# Patient Record
Sex: FEMALE | Race: WHITE | Employment: OTHER | URBAN - METROPOLITAN AREA
[De-identification: names, ages, dates, MRNs, and addresses within clinical notes are randomized per-mention and may not be internally consistent; named-entity substitution may affect disease eponyms.]

---

## 2017-05-22 ENCOUNTER — HOSPITAL ENCOUNTER (OUTPATIENT)
Age: 82
Discharge: HOME | End: 2017-05-22
Attending: FAMILY MEDICINE
Payer: MEDICARE

## 2017-05-22 VITALS
RESPIRATION RATE: 20 BRPM | HEART RATE: 67 BPM | DIASTOLIC BLOOD PRESSURE: 86 MMHG | SYSTOLIC BLOOD PRESSURE: 179 MMHG | OXYGEN SATURATION: 99 %

## 2017-05-22 DIAGNOSIS — J02.9 PHARYNGITIS, UNSPECIFIED ETIOLOGY: Primary | ICD-10-CM

## 2017-05-22 PROCEDURE — 87430 STREP A AG IA: CPT | Performed by: FAMILY MEDICINE

## 2017-05-22 PROCEDURE — 99214 OFFICE O/P EST MOD 30 MIN: CPT

## 2017-05-22 PROCEDURE — 87081 CULTURE SCREEN ONLY: CPT | Performed by: FAMILY MEDICINE

## 2017-05-22 PROCEDURE — 99204 OFFICE O/P NEW MOD 45 MIN: CPT

## 2017-05-22 RX ORDER — IBUPROFEN 200 MG
400 TABLET ORAL ONCE
Status: COMPLETED | OUTPATIENT
Start: 2017-05-22 | End: 2017-05-22

## 2017-05-22 RX ORDER — PREDNISONE 20 MG/1
20 TABLET ORAL DAILY
Qty: 3 TABLET | Refills: 0 | Status: SHIPPED | OUTPATIENT
Start: 2017-05-22 | End: 2017-05-25

## 2017-05-22 RX ORDER — AMOXICILLIN 875 MG/1
875 TABLET, COATED ORAL 2 TIMES DAILY
Qty: 20 TABLET | Refills: 0 | Status: SHIPPED | OUTPATIENT
Start: 2017-05-22 | End: 2017-06-01

## 2017-05-22 NOTE — ED INITIAL ASSESSMENT (HPI)
Friday onset sore throat. FYI - Took 1 Cipro on Friday for possible UTI prescribed by PCP for urinary urgency. Pt then stopped Cipro after reading side effects. She further states she was told by office that she didn't have an infection.    Pt denies s

## 2017-05-24 NOTE — ED PROVIDER NOTES
Patient Seen in: THE [facility] In [facility] END    History   Patient presents with:  Sore Throat    Stated Complaint: sorethroat x 2 days / difficult to swallow    HPI    62-year-old female presents for a sore throat for past 2 days.   States she ha (six) hours as needed for Pain. History reviewed. No pertinent family history.       Smoking Status: Former Smoker                   Packs/Day: 0.00  Years:         Comment: quit at age 30    Alcohol Use: No                Review of Systems    Positiv better      Disposition and Plan     Clinical Impression:  Pharyngitis, unspecified etiology  (primary encounter diagnosis)    Disposition:  Discharge    Follow-up:  Kristian Emerson, [address]  [phone]    In 3 days

## 2017-05-28 ENCOUNTER — HOSPITAL ENCOUNTER (EMERGENCY)
Facility: HOSPITAL | Age: 82
Discharge: HOME | End: 2017-05-28
Attending: EMERGENCY MEDICINE
Payer: MEDICARE

## 2017-05-28 ENCOUNTER — APPOINTMENT (OUTPATIENT)
Dept: GENERAL RADIOLOGY | Age: 82
End: 2017-05-28
Attending: FAMILY MEDICINE
Payer: MEDICARE

## 2017-05-28 ENCOUNTER — HOSPITAL ENCOUNTER (OUTPATIENT)
Age: 82
Discharge: ANOTHER HOSPITAL | End: 2017-05-28
Attending: FAMILY MEDICINE
Payer: MEDICARE

## 2017-05-28 VITALS
HEART RATE: 62 BPM | WEIGHT: 185 LBS | SYSTOLIC BLOOD PRESSURE: 146 MMHG | TEMPERATURE: 98 F | DIASTOLIC BLOOD PRESSURE: 84 MMHG | OXYGEN SATURATION: 98 % | BODY MASS INDEX: 31 KG/M2 | RESPIRATION RATE: 20 BRPM

## 2017-05-28 VITALS
SYSTOLIC BLOOD PRESSURE: 124 MMHG | BODY MASS INDEX: 30.82 KG/M2 | OXYGEN SATURATION: 98 % | HEART RATE: 58 BPM | TEMPERATURE: 98 F | WEIGHT: 185 LBS | HEIGHT: 65 IN | RESPIRATION RATE: 16 BRPM | DIASTOLIC BLOOD PRESSURE: 64 MMHG

## 2017-05-28 DIAGNOSIS — M54.2 NECK PAIN: Primary | ICD-10-CM

## 2017-05-28 DIAGNOSIS — R53.1 WEAKNESS GENERALIZED: ICD-10-CM

## 2017-05-28 DIAGNOSIS — N39.0 URINARY TRACT INFECTION WITHOUT HEMATURIA, SITE UNSPECIFIED: Primary | ICD-10-CM

## 2017-05-28 PROCEDURE — 81002 URINALYSIS NONAUTO W/O SCOPE: CPT | Performed by: FAMILY MEDICINE

## 2017-05-28 PROCEDURE — 99215 OFFICE O/P EST HI 40 MIN: CPT

## 2017-05-28 PROCEDURE — 86308 HETEROPHILE ANTIBODY SCREEN: CPT | Performed by: FAMILY MEDICINE

## 2017-05-28 PROCEDURE — 87086 URINE CULTURE/COLONY COUNT: CPT | Performed by: FAMILY MEDICINE

## 2017-05-28 PROCEDURE — 71020 XR CHEST PA + LAT CHEST (CPT=71020): CPT | Performed by: FAMILY MEDICINE

## 2017-05-28 PROCEDURE — 80053 COMPREHEN METABOLIC PANEL: CPT | Performed by: FAMILY MEDICINE

## 2017-05-28 PROCEDURE — 36415 COLL VENOUS BLD VENIPUNCTURE: CPT

## 2017-05-28 PROCEDURE — 80047 BASIC METABLC PNL IONIZED CA: CPT

## 2017-05-28 PROCEDURE — 85025 COMPLETE CBC W/AUTO DIFF WBC: CPT | Performed by: FAMILY MEDICINE

## 2017-05-28 PROCEDURE — 99283 EMERGENCY DEPT VISIT LOW MDM: CPT

## 2017-05-28 RX ORDER — SULFAMETHOXAZOLE AND TRIMETHOPRIM 800; 160 MG/1; MG/1
1 TABLET ORAL 2 TIMES DAILY
Qty: 6 TABLET | Refills: 0 | Status: SHIPPED | OUTPATIENT
Start: 2017-05-28 | End: 2017-05-31

## 2017-05-28 NOTE — ED INITIAL ASSESSMENT (HPI)
Was sent from [address] [place] [address],[address]. Pt complaining of neck pain and generalized weakness, treated for sorethroat last week. WBC 15.1 at [facility].

## 2017-05-28 NOTE — ED INITIAL ASSESSMENT (HPI)
Pt states seen here 1 week ago for sore throat. Started taking Amoxicillin and took prednisone as prescribed. Pt states sore throat is \"mild\" but  feels \"weak\". States \" I feel like I have the flu\" and haven't left my house in a week.

## 2017-05-28 NOTE — ED PROVIDER NOTES
Patient Seen in: [facility] Emergency Department    History   Patient presents with:  Neck Pain (musculoskeletal, neurologic)    Stated Complaint: neck pain    HPI    47-year-old female sent from immediate care clinic for evaluation of abnormal lab. Sulfamethoxazole-TMP -160 MG Oral Tab per tablet,  Take 1 tablet by mouth 2 (two) times daily. amoxicillin 875 MG Oral Tab,  Take 1 tablet (875 mg total) by mouth 2 (two) times daily. Losartan Potassium 25 MG Oral Tab,  Take 25 mg by mouth daily. rigidity   Lungs: good air exchange and clear   Heart: regular rate rhythm and no murmur   Abdomen: Soft and nontender. No abdominal masses.   No peritoneal signs   Extremities: no edema, normal peripheral pulses   Neuro: Alert oriented and nonfocal   Skin

## 2017-06-02 ENCOUNTER — TELEPHONE (OUTPATIENT)
Dept: URGENT CARE | Age: 82
End: 2017-06-02

## 2017-06-02 NOTE — ED NOTES
Patient called asking the result of urine culture collected 5/28/2017. MAde aware no growth. Stated feels better but not 100%, instructed to follow-up with PCP. Verbalized understanding.

## 2018-09-12 ENCOUNTER — HOSPITAL ENCOUNTER (OUTPATIENT)
Age: 83
Discharge: HOME | End: 2018-09-12
Payer: MEDICARE

## 2018-09-12 ENCOUNTER — APPOINTMENT (OUTPATIENT)
Dept: GENERAL RADIOLOGY | Age: 83
End: 2018-09-12
Payer: MEDICARE

## 2018-09-12 VITALS
OXYGEN SATURATION: 98 % | WEIGHT: 173 LBS | DIASTOLIC BLOOD PRESSURE: 78 MMHG | RESPIRATION RATE: 20 BRPM | SYSTOLIC BLOOD PRESSURE: 144 MMHG | TEMPERATURE: 98 F | HEIGHT: 65 IN | HEART RATE: 56 BPM | BODY MASS INDEX: 28.82 KG/M2

## 2018-09-12 DIAGNOSIS — M25.572 ACUTE LEFT ANKLE PAIN: Primary | ICD-10-CM

## 2018-09-12 PROCEDURE — 99213 OFFICE O/P EST LOW 20 MIN: CPT

## 2018-09-12 PROCEDURE — 73610 X-RAY EXAM OF ANKLE: CPT

## 2018-09-12 NOTE — ED INITIAL ASSESSMENT (HPI)
Left ankle- pain started Monday. Denies any injury, C/o pain when walking. Swelling noted on the lateral ankle. Pt applied ace bandage states it helps a little bit with pain, took ibuprofen.

## 2018-09-12 NOTE — ED PROVIDER NOTES
Patient Seen in: [facility]    History   Patient presents with:   Ankle Pain    Stated Complaint: 3 DAYS LT ANKLE PAIN NO KNOWN INJURY    HPI    CHIEF COMPLAINT: Left ankle pain     HISTORY OF PRESENT ILLNESS: Patient is a pleasa Smoking status: Former Smoker      Smokeless tobacco: Never Used      Tobacco comment: quit at age 30    Alcohol use: No    Drug use: No      Review of Systems    Positive for stated complaint: 3 DAYS LT ANKLE PAIN NO KNOWN INJURY  Other systems are as not spurring. Mild circumferential soft tissue swelling. CONCLUSION:  No evidence of acute displaced fracture or dislocation in the left ankle. Mild soft tissue swelling.   Dictated by: Joseph Prince MD on 9/12/2018 at 15:01     Approved by: Amie Booth

## 2019-06-16 PROBLEM — K21.9 GASTROESOPHAGEAL REFLUX DISEASE WITHOUT ESOPHAGITIS: Status: ACTIVE | Noted: 2019-06-16

## 2019-06-16 PROBLEM — K86.2 CYST OF PANCREAS: Status: ACTIVE | Noted: 2019-06-16

## 2019-07-02 ENCOUNTER — HOSPITAL ENCOUNTER (OUTPATIENT)
Facility: HOSPITAL | Age: 84
Setting detail: HOSPITAL OUTPATIENT SURGERY
Discharge: HOME | End: 2019-07-02
Attending: INTERNAL MEDICINE | Admitting: INTERNAL MEDICINE
Payer: MEDICARE

## 2019-07-02 ENCOUNTER — ANESTHESIA (OUTPATIENT)
Dept: ENDOSCOPY | Facility: HOSPITAL | Age: 84
End: 2019-07-02

## 2019-07-02 ENCOUNTER — ANESTHESIA EVENT (OUTPATIENT)
Dept: ENDOSCOPY | Facility: HOSPITAL | Age: 84
End: 2019-07-02

## 2019-07-02 VITALS
OXYGEN SATURATION: 97 % | RESPIRATION RATE: 18 BRPM | SYSTOLIC BLOOD PRESSURE: 149 MMHG | TEMPERATURE: 98 F | HEART RATE: 58 BPM | BODY MASS INDEX: 29.99 KG/M2 | WEIGHT: 180 LBS | HEIGHT: 65 IN | DIASTOLIC BLOOD PRESSURE: 68 MMHG

## 2019-07-02 DIAGNOSIS — K86.2 CYST OF PANCREAS: ICD-10-CM

## 2019-07-02 DIAGNOSIS — K21.9 GASTROESOPHAGEAL REFLUX DISEASE WITHOUT ESOPHAGITIS: ICD-10-CM

## 2019-07-02 PROCEDURE — BD47ZZZ ULTRASONOGRAPHY OF GASTROINTESTINAL TRACT: ICD-10-PCS | Performed by: INTERNAL MEDICINE

## 2019-07-02 PROCEDURE — 0F9G4ZX DRAINAGE OF PANCREAS, PERCUTANEOUS ENDOSCOPIC APPROACH, DIAGNOSTIC: ICD-10-PCS | Performed by: INTERNAL MEDICINE

## 2019-07-02 PROCEDURE — 88305 TISSUE EXAM BY PATHOLOGIST: CPT | Performed by: INTERNAL MEDICINE

## 2019-07-02 PROCEDURE — 88342 IMHCHEM/IMCYTCHM 1ST ANTB: CPT | Performed by: INTERNAL MEDICINE

## 2019-07-02 PROCEDURE — 88177 CYTP FNA EVAL EA ADDL: CPT | Performed by: INTERNAL MEDICINE

## 2019-07-02 PROCEDURE — 0DB68ZX EXCISION OF STOMACH, VIA NATURAL OR ARTIFICIAL OPENING ENDOSCOPIC, DIAGNOSTIC: ICD-10-PCS | Performed by: INTERNAL MEDICINE

## 2019-07-02 PROCEDURE — 88172 CYTP DX EVAL FNA 1ST EA SITE: CPT | Performed by: INTERNAL MEDICINE

## 2019-07-02 PROCEDURE — 0DB98ZX EXCISION OF DUODENUM, VIA NATURAL OR ARTIFICIAL OPENING ENDOSCOPIC, DIAGNOSTIC: ICD-10-PCS | Performed by: INTERNAL MEDICINE

## 2019-07-02 PROCEDURE — 88313 SPECIAL STAINS GROUP 2: CPT | Performed by: INTERNAL MEDICINE

## 2019-07-02 PROCEDURE — 88173 CYTOPATH EVAL FNA REPORT: CPT | Performed by: INTERNAL MEDICINE

## 2019-07-02 RX ORDER — SODIUM CHLORIDE, SODIUM LACTATE, POTASSIUM CHLORIDE, CALCIUM CHLORIDE 600; 310; 30; 20 MG/100ML; MG/100ML; MG/100ML; MG/100ML
INJECTION, SOLUTION INTRAVENOUS CONTINUOUS
Status: DISCONTINUED | OUTPATIENT
Start: 2019-07-02 | End: 2019-07-02

## 2019-07-02 RX ORDER — NALOXONE HYDROCHLORIDE 0.4 MG/ML
80 INJECTION, SOLUTION INTRAMUSCULAR; INTRAVENOUS; SUBCUTANEOUS AS NEEDED
Status: DISCONTINUED | OUTPATIENT
Start: 2019-07-02 | End: 2019-07-02

## 2019-07-02 NOTE — ANESTHESIA PREPROCEDURE EVALUATION
PRE-OP EVALUATION    Patient Name: Juliana Youssef    Pre-op Diagnosis: Gastroesophageal reflux disease without esophagitis [K21.9]  Cyst of pancreas [K86.2]    Procedure(s):  ESOPHAGOGASTRODUODENOSCOPY AND ENDOSCOPIC ULTRASOUND      Surgeon(s) and Pedro Performed by Aleyda Sherman MD at [address] [facility] ENDOSCOPY   • ESOPHAGOGASTRODUODENOSCOPY (EGD) N/A 9/12/2016    Performed by Aleyda Sherman MD at [facility] ENDOSCOPY   • HYSTERECTOMY     • [address]  6-2015     Social History    Tobacco Use      S

## 2019-07-02 NOTE — ANESTHESIA POSTPROCEDURE EVALUATION
[address] Patient Status:  Hospital Outpatient Surgery   Age/Gender 82year old female MRN [id number]   Location [facility]. Attending Kaci Rehman MD   Hosp Day # 0 PCP Renetta Rogers MD       Anesthesia Post

## 2019-07-02 NOTE — H&P
[address] Patient Status:  Hospital Outpatient Surgery    1935 MRN [id number]   [facility] ENDOSCOPY Attending Sylvia Tavarez MD   Date 2019 PCP Amelia Mariscal MD     CC: Pancreas cys resp. rate 18, height 65\", weight 180 lb, SpO2 97 %. General: Appears alert, oriented x3 and in no acute distress. HEENT: Normal.  CV: S1 and S2 normal.    Lungs: Clear to auscultation. Abdomen: Soft and nondistended. Nontender. No masses.   Bowel s

## 2019-07-02 NOTE — OPERATIVE REPORT
Clara Hutchinson Patient Status:  Hospital Outpatient Surgery    1935 MRN [id number]   [facility] ENDOSCOPY Attending Ambreen Allen MD   Date 2019 PCP Livan Gomez MD     PREOPERATIVE DIAGNOSIS/INDICATION: Pancreas cyst  showed a hypoechoic honeycombed likely microcystic lesion at the uncinate measuring 1.7 x 1.3 cm in diameter, the main PD appear wnl, the GB was not visualized, the biliary tree appear wnl, the confluence of the portal vein, superior mesenteric vein and sp

## 2019-12-27 PROBLEM — M17.9 DEGENERATIVE JOINT DISEASE OF KNEE: Status: ACTIVE | Noted: 2019-12-27

## 2019-12-27 PROBLEM — I10 BENIGN ESSENTIAL HYPERTENSION: Status: ACTIVE | Noted: 2019-08-25

## 2019-12-27 PROBLEM — E66.9 OBESITY (BMI 30-39.9): Status: ACTIVE | Noted: 2019-11-23

## 2019-12-27 PROBLEM — M17.10 DEGENERATIVE JOINT DISEASE OF KNEE: Status: ACTIVE | Noted: 2019-12-27

## 2020-07-22 PROBLEM — Z86.73 HISTORY OF CVA (CEREBROVASCULAR ACCIDENT): Status: ACTIVE | Noted: 2020-07-22

## 2020-07-22 PROBLEM — H52.532: Status: ACTIVE | Noted: 2020-07-22

## 2020-07-22 PROBLEM — Z20.9 EXPOSURE TO COMMUNICABLE DISEASE: Status: ACTIVE | Noted: 2020-07-22

## 2020-07-22 PROBLEM — M80.88XD OSTEOPOROTIC COMPRESSION FRACTURE OF SPINE WITH ROUTINE HEALING: Status: ACTIVE | Noted: 2020-07-22

## 2020-07-22 PROBLEM — M62.830 MUSCLE SPASM OF BACK: Status: ACTIVE | Noted: 2020-07-22

## 2021-04-21 ENCOUNTER — HOSPITAL ENCOUNTER (OUTPATIENT)
Age: 86
Discharge: HOME | End: 2021-04-21
Payer: MEDICARE

## 2021-04-21 ENCOUNTER — APPOINTMENT (OUTPATIENT)
Dept: GENERAL RADIOLOGY | Age: 86
End: 2021-04-21
Attending: NURSE PRACTITIONER
Payer: MEDICARE

## 2021-04-21 VITALS
DIASTOLIC BLOOD PRESSURE: 70 MMHG | BODY MASS INDEX: 29.99 KG/M2 | WEIGHT: 180 LBS | HEART RATE: 55 BPM | HEIGHT: 65 IN | SYSTOLIC BLOOD PRESSURE: 154 MMHG | TEMPERATURE: 98 F | OXYGEN SATURATION: 98 % | RESPIRATION RATE: 18 BRPM

## 2021-04-21 DIAGNOSIS — S39.012A STRAIN OF LUMBAR REGION, INITIAL ENCOUNTER: Primary | ICD-10-CM

## 2021-04-21 DIAGNOSIS — S80.01XA CONTUSION OF RIGHT KNEE, INITIAL ENCOUNTER: ICD-10-CM

## 2021-04-21 PROCEDURE — 99214 OFFICE O/P EST MOD 30 MIN: CPT

## 2021-04-21 PROCEDURE — 73560 X-RAY EXAM OF KNEE 1 OR 2: CPT | Performed by: NURSE PRACTITIONER

## 2021-04-21 PROCEDURE — 72110 X-RAY EXAM L-2 SPINE 4/>VWS: CPT | Performed by: NURSE PRACTITIONER

## 2021-04-21 PROCEDURE — 99213 OFFICE O/P EST LOW 20 MIN: CPT

## 2021-04-21 NOTE — ED INITIAL ASSESSMENT (HPI)
Pt tripped over her feet and fell on Monday. Landed on right knee and twisted lower back. History of L1 fracture. Denies head injury or LOC.

## 2021-04-21 NOTE — ED PROVIDER NOTES
Patient Seen in: [facility]      History   Patient presents with:  Fall    Stated Complaint: back pain,fell    HPI/Subjective:   HPI  19-year-old female presents to immediate care complaining of right knee pain which she had a knee replac 10.00        Pack years: 20      Smokeless tobacco: Never Used      Tobacco comment: quit at age 30    Vaping Use      Vaping Use: Never used    Alcohol use: Yes      Comment: occasional    Drug use:  No             Review of Systems   All other systems rev 4/21/2021  PROCEDURE:  XR KNEE (1 OR 2 VIEWS), RIGHT (CPT=73560)  COMPARISON:  None. INDICATIONS:  back pain,fell  PATIENT STATED HISTORY: (As transcribed by Technologist)  Patient fell landing on right knee. History of right knee replacement 2 years ago. changes of previous L1 kyphoplasty with degenerative changes. Patient with no neuro deficit. Patient instructed on Tylenol and lidocaine/Salonpas patches as needed for pain.   ER precautions were also provided along with close follow-up with her primary c

## 2021-04-27 PROBLEM — Z96.651 PRESENCE OF RIGHT ARTIFICIAL KNEE JOINT: Status: ACTIVE | Noted: 2019-12-18

## 2021-04-27 PROBLEM — M54.50 LOW BACK PAIN: Status: ACTIVE | Noted: 2019-12-18

## 2021-04-27 PROBLEM — S32.010D WEDGE COMPRESSION FRACTURE OF FIRST LUMBAR VERTEBRA, SUBSEQUENT ENCOUNTER FOR FRACTURE WITH ROUTINE HEALING: Status: ACTIVE | Noted: 2019-12-18

## 2021-04-27 PROBLEM — N30.90 CYSTITIS, UNSPECIFIED WITHOUT HEMATURIA: Status: ACTIVE | Noted: 2019-12-18

## 2021-04-27 PROBLEM — M80.88XD OSTEOPOROTIC COMPRESSION FRACTURE OF SPINE WITH ROUTINE HEALING: Status: RESOLVED | Noted: 2020-07-22 | Resolved: 2021-04-27

## 2021-04-27 PROBLEM — M80.08XD AGE-RELATED OSTEOPOROSIS WITH CURRENT PATHOLOGICAL FRACTURE, VERTEBRA(E), SUBSEQUENT ENCOUNTER FOR FRACTURE WITH ROUTINE HEALING: Status: ACTIVE | Noted: 2019-12-18

## 2021-05-17 PROBLEM — M79.18 MYOFASCIAL PAIN SYNDROME: Status: ACTIVE | Noted: 2021-05-17

## 2021-05-17 PROBLEM — M47.816 LUMBAR SPONDYLOSIS: Status: ACTIVE | Noted: 2021-05-17

## 2021-05-17 PROBLEM — Z96.651 HISTORY OF TOTAL RIGHT KNEE REPLACEMENT: Status: ACTIVE | Noted: 2019-12-18

## 2021-05-17 PROBLEM — M17.12 OSTEOARTHRITIS OF LEFT KNEE, UNSPECIFIED OSTEOARTHRITIS TYPE: Status: ACTIVE | Noted: 2019-12-27

## 2021-11-03 PROBLEM — S80.01XA CONTUSION OF RIGHT KNEE, INITIAL ENCOUNTER: Status: ACTIVE | Noted: 2021-11-03

## 2022-05-19 ENCOUNTER — OFFICE VISIT (OUTPATIENT)
Dept: NEUROLOGY | Facility: CLINIC | Age: 87
End: 2022-05-19
Payer: MEDICARE

## 2022-05-19 ENCOUNTER — TELEPHONE (OUTPATIENT)
Dept: NEUROLOGY | Facility: CLINIC | Age: 87
End: 2022-05-19

## 2022-05-19 VITALS
BODY MASS INDEX: 31 KG/M2 | SYSTOLIC BLOOD PRESSURE: 138 MMHG | RESPIRATION RATE: 16 BRPM | WEIGHT: 184 LBS | HEART RATE: 60 BPM | DIASTOLIC BLOOD PRESSURE: 62 MMHG

## 2022-05-19 DIAGNOSIS — R48.0 ALEXIA: ICD-10-CM

## 2022-05-19 DIAGNOSIS — Z86.73 HISTORY OF CVA (CEREBROVASCULAR ACCIDENT): Primary | ICD-10-CM

## 2022-05-19 DIAGNOSIS — R26.9 GAIT DISORDER: ICD-10-CM

## 2022-05-19 PROCEDURE — 99204 OFFICE O/P NEW MOD 45 MIN: CPT | Performed by: OTHER

## 2022-05-19 RX ORDER — CYCLOSPORINE 0.5 MG/ML
1 EMULSION OPHTHALMIC 2 TIMES DAILY
COMMUNITY
Start: 2022-03-17

## 2022-05-19 NOTE — TELEPHONE ENCOUNTER
pt filled out NILDA to [facility] med recs from [facility] for date of 5/5/22; [facility] faxed to [phone], confirmation rec'd; NILDA sent to scanning

## 2022-05-24 ENCOUNTER — TELEPHONE (OUTPATIENT)
Dept: NEUROLOGY | Facility: CLINIC | Age: 87
End: 2022-05-24

## 2022-06-03 NOTE — TELEPHONE ENCOUNTER
Patient given Dr Bruna Anders message and verbalized understanding. States will call office back for f/u kimi't.

## 2022-06-03 NOTE — TELEPHONE ENCOUNTER
I reviewed records. I do not need any new tests at this time, you should continue asa 325 mg daily and see me in 2-3 months for follow up, please call office for any questions or new symptoms.

## (undated) DEVICE — FILTERLINE NASAL ADULT O2/CO2

## (undated) DEVICE — 3M™ RED DOT™ MONITORING ELECTRODE WITH FOAM TAPE AND STICKY GEL, 50/BAG, 20/CASE, 72/PLT 2570: Brand: RED DOT™

## (undated) DEVICE — ENDOSCOPIC ULTRASOUND FINE NEEDLE BIOPSY (FNB) DEVICE: Brand: ACQUIRE

## (undated) DEVICE — FORCEP BIOPSY RJ4 LG CAP W/ND

## (undated) DEVICE — ENDOSCOPY PACK UPPER: Brand: MEDLINE INDUSTRIES, INC.

## (undated) DEVICE — Device: Brand: DEFENDO AIR/WATER/SUCTION AND BIOPSY VALVE

## (undated) DEVICE — 1200CC GUARDIAN II: Brand: GUARDIAN

## (undated) NOTE — ED AVS SNAPSHOT
[facility] Emergency Department    [place]  [address] [place] [address]    Phone:  [phone]    Fax:  3 [facility]   MRN: [id number]    Department:  [facility] Emergency Department   Date of Visit: If you have any problems with your follow-up, please call our  at [phone]    Si usted tiene algun problema con ruiz sequimiento, por favor llame a nuestro adminstrador de casos al [phone]    Expect to receive an electronic request Tita Gibson [address] ([address]) [place] ([address] [address] [facility]9   [facility] [address] ([address] [facility]) [address]  (Do Enter your [employer] Activation Code exactly as it appears below along with your Zip Code and Date of Birth to complete the sign-up process. If you do not sign up before the expiration date, you must request a new code.     Your unique [employer] Access Code: [email or web address]

## (undated) NOTE — ED AVS SNAPSHOT
[facility] in [address] [place] [address] Drive,[address]Th Floor    [address] [place] [address]    Phone:  [phone]    Fax:  [phone]           Jaun Carranza   MRN: [id number]    Department:  THE [facility] OF [facility] in [facility]   Date of Visit:  5/28/2017 Expect to receive an electronic request (by e-mail or text) to complete a self-assessment the day after your visit. You may also receive a call from our patient liason soon after your visit.  Also, some patients receive a detailed feedback survey mailed to [place] [id number] [place]  ([address]) [address] [facility] [phone] [address] ([address] [place] [address]) [phone] [address] [address] [address] ( PATIENT STATED HISTORY: (As transcribed by Technologist)  Sore thoat with body aches for 1 week. FINDINGS:    Cardiac size and pulmonary vasculature are within normal limits. No pleural effusions. No pneumothorax. Hyperexpansion of the lungs.  Athe

## (undated) NOTE — LETTER
Date: 7/10/2019  Patient Name:  Liliam Odonnell             Address: [address] [id number]    : 1935    Dear Liliam Odonnell,    I hope this letter finds you doing well.   I am writing to inform you of the following:     T

## (undated) NOTE — LETTER
Date: 2019  Patient Name:  Clara Hutchinson             Address: [address] [id number]    : 1935    Dear Clara Hutchinson,    I hope this letter finds you doing well. I am writing to inform you of the following:      Laurita Gunderson

## (undated) NOTE — ED AVS SNAPSHOT
[facility] in [address] [place] [address] Drive,[address]Th Floor    [address] [place] [address]    Phone:  [phone]    Fax:  [phone]           Selina Anita   MRN: [id number]    Department:  THE [facility] OF [facility] in [facility] END   Date of Visit:  5/22/2017 SORE THROAT, WHEN YOU HAVE A (ENGLISH)      Disclosure     Insurance plans vary and the physician(s) referred by the Immediate Care may not be covered by your plan.  Please contact your insurance company to determine coverage for follow-up care and referra CARE PHYSICIAN AT ONCE OR GO TO THE EMERGENCY DEPARTMENT. If you have been prescribed any medication(s), please fill your prescription right away and begin taking the medication(s) as directed.     If the Immediate Care Provider has read X-rays, these wi coverage. Patient [address] is a Federal Navigator program that can help with your Affordable Care Act coverage, as well as all types of Medicaid plans.   To get signed up and covered, please call [phone] and ask to get set up for an insuran